# Patient Record
Sex: FEMALE | Race: AMERICAN INDIAN OR ALASKA NATIVE
[De-identification: names, ages, dates, MRNs, and addresses within clinical notes are randomized per-mention and may not be internally consistent; named-entity substitution may affect disease eponyms.]

---

## 2021-12-01 ENCOUNTER — HOSPITAL ENCOUNTER (EMERGENCY)
Dept: HOSPITAL 5 - ED | Age: 21
Discharge: HOME | End: 2021-12-01
Payer: SELF-PAY

## 2021-12-01 VITALS — DIASTOLIC BLOOD PRESSURE: 71 MMHG | SYSTOLIC BLOOD PRESSURE: 115 MMHG

## 2021-12-01 DIAGNOSIS — R10.31: Primary | ICD-10-CM

## 2021-12-01 DIAGNOSIS — R10.32: ICD-10-CM

## 2021-12-01 LAB
BILIRUB UR QL STRIP: (no result)
BLOOD UR QL VISUAL: (no result)
MUCOUS THREADS #/AREA URNS HPF: (no result) /HPF
PH UR STRIP: 5 [PH] (ref 5–7)
RBC #/AREA URNS HPF: 4 /HPF (ref 0–6)
UROBILINOGEN UR-MCNC: 4 MG/DL (ref ?–2)
WBC #/AREA URNS HPF: 5 /HPF (ref 0–6)

## 2021-12-01 PROCEDURE — 81001 URINALYSIS AUTO W/SCOPE: CPT

## 2021-12-01 PROCEDURE — 81025 URINE PREGNANCY TEST: CPT

## 2021-12-01 PROCEDURE — 99283 EMERGENCY DEPT VISIT LOW MDM: CPT

## 2021-12-01 NOTE — EMERGENCY DEPARTMENT REPORT
ED General Adult HPI





- General


Chief complaint: Abdominal Pain


Stated complaint: ABD PAIN/DIZZINESS


Time Seen by Provider: 12/01/21 01:06


Source: patient


Mode of arrival: Ambulatory


Limitations: No Limitations





- History of Present Illness


Initial comments: 


Patient 21-year-old -American female who presents for bilateral lower 

abdominal pain.  Patient states history of painful cycles.  Last menstrual cycle

1 month ago.  Patient describes symptoms as 5/10 aching and cramping.  There is 

no nausea vomiting there is no fever or chills.  no vaginal bleeding. Patient 

advises no concern for STI.  She denies dysuria frequency or urgency.


Severity scale (0 -10): 7





- Related Data


                                  Previous Rx's











 Medication  Instructions  Recorded  Last Taken  Type


 


Ibuprofen [Motrin 800 MG tab] 800 mg PO Q8HR PRN #30 tablet 12/01/21 Unknown Rx











                                    Allergies











Allergy/AdvReac Type Severity Reaction Status Date / Time


 


No Known Allergies Allergy   Verified 12/01/21 00:49














ED Review of Systems


ROS: 


Stated complaint: ABD PAIN/DIZZINESS


Other details as noted in HPI





Constitutional: denies: chills, fever


Eyes: denies: eye pain, eye discharge, vision change


ENT: denies: ear pain, throat pain


Respiratory: denies: cough, shortness of breath, wheezing


Cardiovascular: denies: chest pain, palpitations


Endocrine: no symptoms reported


Gastrointestinal: abdominal pain.  denies: nausea, vomiting, diarrhea, 

constipation, hematemesis, melena, hematochezia


Genitourinary: abnormal menses.  denies: urgency, dysuria, frequency, hematuria,

discharge, dyspareunia


Musculoskeletal: back pain.  denies: joint swelling, arthralgia


Skin: denies: rash, lesions


Neurological: denies: headache, weakness, numbness, paresthesias, confusion, 

vertigo


Psychiatric: denies: anxiety, depression


Hematological/Lymphatic: denies: easy bleeding, easy bruising





ED Past Medical Hx





- Past Medical History


Previous Medical History?: No





- Surgical History


Past Surgical History?: No





- Medications


Home Medications: 


                                Home Medications











 Medication  Instructions  Recorded  Confirmed  Last Taken  Type


 


Ibuprofen [Motrin 800 MG tab] 800 mg PO Q8HR PRN #30 tablet 12/01/21  Unknown Rx














ED Physical Exam





- General


Limitations: No Limitations


General appearance: alert, in no apparent distress





- Head


Head exam: Present: atraumatic, normocephalic, normal inspection





- Eye


Eye exam: Present: normal appearance, PERRL, EOMI.  Absent: nystagmus


Pupils: Present: normal accommodation





- ENT


ENT exam: Present: normal exam, mucous membranes moist





- Neck


Neck exam: Present: normal inspection, full ROM.  Absent: tenderness





- Respiratory


Respiratory exam: Present: normal lung sounds bilaterally.  Absent: respiratory 

distress, wheezes, stridor, chest wall tenderness





- Cardiovascular


Cardiovascular Exam: Present: regular rate, normal rhythm, normal heart sounds. 

Absent: systolic murmur, diastolic murmur, rubs, gallop





- GI/Abdominal


GI/Abdominal exam: Present: soft, normal bowel sounds.  Absent: distended, 

tenderness, guarding, rebound, rigid, bruit, hernia





- Rectal


Rectal exam: Present: deferred





- 


External exam: Present: normal external exam





- Extremities Exam


Extremities exam: Present: normal inspection, full ROM, tenderness, normal 

capillary refill, joint swelling





- Back Exam


Back exam: Present: normal inspection, full ROM, CVA tenderness (R), CVA 

tenderness (L)





- Neurological Exam


Neurological exam: Present: alert, oriented X3, normal gait





- Psychiatric


Psychiatric exam: Present: normal affect, normal mood





- Skin


Skin exam: Present: warm, dry, intact, normal color.  Absent: rash





ED Course


                                   Vital Signs











  12/01/21





  00:43


 


Temperature 98.4 F


 


Pulse Rate 103 H


 


Respiratory 17





Rate 


 


Blood Pressure 115/71





[Right] 


 


O2 Sat by Pulse 98





Oximetry 














ED Medical Decision Making





- Radiology Data


Radiology results: report reviewed, image reviewed


ULTRASOUND ABDOMEN, LIMITED (RIGHT UPPER QUADRANT)  


 


 INDICATION:  


 RUQ pain.  


 


 COMPARISON:  


 11/07/21.  


 


 FINDINGS:  


 PANCREAS: The pancreatic head is normal. The pancreatic body and tail are 

obscured by bowel gas.  


 LIVER: 15.6 cm in length with a normal echo pattern, no focal lesion and normal

 directional blood 


flow in the main portal vein.   


 GALLBLADDER: Multiple gallstones. The gallbladder is normal in size without 

wall thickening.   


 BILE DUCTS: No significant abnormality. Common bile duct measures 2.9 mm.   


 


 FREE FLUID: None.  


 ADDITIONAL FINDINGS: None.  


 


 IMPRESSION: Cholelithiasis without sonographic evidence of acute cholecystitis.

 No significant 


change since 11/07/21.  


 


 Signer Name: Efrain Trevizo MD   


 Signed: 12/1/2021 12:26 AM  


 Workstation Name: [id number]   








- Medical Decision Making


Labs are normal hCG is negative for pregnancy.  This is likely PLS as exam is 

benign.  Pain is improved with medication given in ED





Critical care attestation.: 


If time is entered above; I have spent that time in minutes in the direct care 

of this critically ill patient, excluding procedure time.








ED Disposition


Clinical Impression: 


Abdominal pain


Qualifiers:


 Abdominal location: lower abdomen, unspecified Qualified Code(s): R10.30 - 

Lower abdominal pain, unspecified





Disposition: 01 HOME / SELF CARE / HOMELESS


Is pt being admited?: No


Does the pt Need Aspirin: No


Condition: Stable


Instructions:  Abdominal Pain (ED), Abdominal Pain, Adult, Dysmenorrhea


Additional Instructions: 


Medication as prescribed, follow-up with your GYN, return to emergency room if 

symptoms worsen.


Prescriptions: 


Ibuprofen [Motrin 800 MG tab] 800 mg PO Q8HR PRN #30 tablet


 PRN Reason: Pain , Severe (7-10)


Referrals: 


[facility] [Provider Group] - 3-5 Days


Forms:  Work/School Release Form(ED)


Time of Disposition: 02:55